# Patient Record
Sex: FEMALE | Race: WHITE | NOT HISPANIC OR LATINO | Employment: OTHER | URBAN - METROPOLITAN AREA
[De-identification: names, ages, dates, MRNs, and addresses within clinical notes are randomized per-mention and may not be internally consistent; named-entity substitution may affect disease eponyms.]

---

## 2020-02-04 ENCOUNTER — PROCEDURE VISIT (OUTPATIENT)
Dept: OBSTETRICS AND GYNECOLOGY | Facility: CLINIC | Age: 45
End: 2020-02-04
Payer: MEDICAID

## 2020-02-04 DIAGNOSIS — Z34.01 ENCOUNTER FOR SUPERVISION OF NORMAL FIRST PREGNANCY IN FIRST TRIMESTER: Primary | ICD-10-CM

## 2020-02-04 DIAGNOSIS — Z34.81 ENCOUNTER FOR SUPERVISION OF NORMAL PREGNANCY IN MULTIGRAVIDA IN FIRST TRIMESTER: Primary | ICD-10-CM

## 2020-02-04 PROCEDURE — 76817 PR US, OB, TRANSVAG APPROACH: ICD-10-PCS | Mod: S$GLB,,, | Performed by: OBSTETRICS & GYNECOLOGY

## 2020-02-04 PROCEDURE — 76817 TRANSVAGINAL US OBSTETRIC: CPT | Mod: S$GLB,,, | Performed by: OBSTETRICS & GYNECOLOGY

## 2020-02-05 LAB
ABS NRBC COUNT: 0 X 10 3/UL (ref 0–0.01)
ABSOLUTE BASOPHIL: 0.03 X 10 3/UL (ref 0–0.22)
ABSOLUTE EOSINOPHIL: 0.31 X 10 3/UL (ref 0.04–0.54)
ABSOLUTE IMMATURE GRAN: 0.05 X 10 3/UL (ref 0–0.04)
ABSOLUTE LYMPHOCYTE: 2.11 X 10 3/UL (ref 0.86–4.75)
ABSOLUTE MONOCYTE: 0.44 X 10 3/UL (ref 0.22–1.08)
AMPHETAMINES (500): NEGATIVE NG/ML
ANTIBODY SCREEN: NEGATIVE
BARBITURATES (200): NEGATIVE NG/ML
BASOPHILS NFR BLD: 0.3 % (ref 0.2–1.2)
BENZODIAZEPINES: NEGATIVE NG/ML
BLOOD GROUPING: NORMAL
BLOOD TYPE (D): NEGATIVE
COCAINE (150): NEGATIVE NG/ML
EOSINOPHIL NFR BLD: 3.4 % (ref 0.7–7)
HBV SURFACE AG SERPL QL IA: NONREACTIVE
HCT VFR BLD AUTO: 36.6 % (ref 37–47)
HGB BLD-MCNC: 11.7 G/DL (ref 12–16)
HIV 1+2 AB+HIV1 P24 AG SERPL QL IA: NONREACTIVE
IMMATURE GRANULOCYTES: 0.6 % (ref 0–0.5)
LYMPHOCYTES NFR BLD: 23.3 % (ref 19.3–53.1)
MARIJUANA, THC (50): NEGATIVE NG/ML
MCH RBC QN AUTO: 31.5 PG (ref 27–32)
MCHC RBC AUTO-ENTMCNC: 32 G/DL (ref 32–36)
MCV RBC AUTO: 98.7 FL (ref 82–100)
METHADONE: NEGATIVE NG/ML
MONOCYTES NFR BLD: 4.9 % (ref 4.7–12.5)
NEUTROPHILS ABSOLUTE COUNT: 6.11 X 10 3/UL (ref 2.15–7.56)
NEUTROPHILS NFR BLD: 67.5 %
NUCLEATED RED BLOOD CELLS: 0 /100 WBC (ref 0–0.2)
OPIATES: ABNORMAL NG/ML
OXYCODONE: NEGATIVE NG/ML
PH: 6.2 (ref 4.5–8)
PHENCYCLIDINE (25): NEGATIVE NG/ML
PLATELET # BLD AUTO: 483 X 10 3/UL (ref 135–400)
RBC # BLD AUTO: 3.71 X 10 6/UL (ref 4.2–5.4)
RDW-SD: 44 FL (ref 37–54)
RUBELLA IGG SCREEN: NORMAL
SICKLE CELL PREP: NEGATIVE
SYPHILIS TREPONEMAL ANTIBODY: NONREACTIVE
URINE CREATININE D/S: 28.3 MG/DL
WBC # BLD: 9.05 X 10 3/UL (ref 4.3–10.8)

## 2020-02-11 ENCOUNTER — ROUTINE PRENATAL (OUTPATIENT)
Dept: OBSTETRICS AND GYNECOLOGY | Facility: CLINIC | Age: 45
End: 2020-02-11
Payer: MEDICAID

## 2020-02-11 VITALS — DIASTOLIC BLOOD PRESSURE: 70 MMHG | SYSTOLIC BLOOD PRESSURE: 111 MMHG | HEART RATE: 80 BPM | WEIGHT: 127 LBS

## 2020-02-11 DIAGNOSIS — Z34.81 ENCOUNTER FOR SUPERVISION OF NORMAL PREGNANCY IN MULTIGRAVIDA IN FIRST TRIMESTER: Primary | ICD-10-CM

## 2020-02-11 DIAGNOSIS — O09.521 MULTIGRAVIDA OF ADVANCED MATERNAL AGE IN FIRST TRIMESTER: ICD-10-CM

## 2020-02-11 DIAGNOSIS — Z90.49 HISTORY OF BOWEL RESECTION: ICD-10-CM

## 2020-02-11 PROCEDURE — 99204 OFFICE O/P NEW MOD 45 MIN: CPT | Mod: TH,S$GLB,, | Performed by: OBSTETRICS & GYNECOLOGY

## 2020-02-11 PROCEDURE — 99204 PR OFFICE/OUTPT VISIT, NEW, LEVL IV, 45-59 MIN: ICD-10-PCS | Mod: TH,S$GLB,, | Performed by: OBSTETRICS & GYNECOLOGY

## 2020-02-11 NOTE — PROGRESS NOTES
Subjective:       Patient ID: Mary Alice Lanier is a 44 y.o.  at 11w1d   Chief Complaint:  Initial Prenatal Visit      History of Present Illness  here for new ob exam.  Labs and history were reviewed with the patient today  No complaints other than some abdominal pain associated with her recent surgery in January.    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  She had emergency surgery in January.  She said she had several x-rays and a CT of her lower abdomen.  She is concerned about the radiation to the fetus.  The patient is 44 years old and will need to see maternal Fetal Medicine.    Her history is significant for 4 vaginal deliveries.  They all seem to be in delivered around 36-37 weeks.  I did not feel that she needed to be on progesterone.      OB History  OB History    Para Term  AB Living   7 4 3 1 2 4   SAB TAB Ectopic Multiple Live Births           4      # Outcome Date GA Lbr Tristin/2nd Weight Sex Delivery Anes PTL Lv   7 Current            6 Term  38w5d    Vag-Spont   CROW   5 Term  38w0d    Vag-Spont   CROW   4 Term  37w5d    Vag-Spont   CROW   3   36w5d    Vag-Spont   CROW   2 AB            1 AB                Review of Systems  nml 1st trimester sx- sob, dec excercixe tolerance, fatigue and nausea  Neg for vag bleed, dc, vomiting, cp, lof, fever, chills, ns, visual changes, swelling, headaches, constipation/diarrhea, dysuria, freq/urgency of urination     Objective:     Vitals:    20 0803   BP: 111/70   Pulse: 80       NAD  NCAT  pupils normal size  Skin nml no rashes or lesions  No resp distress, resp even and unlabored  nt nd, no rebound no guarding  nml ext fem gent, normal cvx uterus and adnexa, no dc or bleeding  nml appearing rectum  No cyanosis or clubbing, edema appropriate for pregn    Uterus size approp for gest age         Assessment:        1. Encounter for supervision of normal pregnancy in multigravida in first trimester    2.  Multigravida of advanced maternal age in first trimester               Plan:      Encounter for supervision of normal pregnancy in multigravida in first trimester    Multigravida of advanced maternal age in first trimester         Pap gc cz  Start asa  Declines cfDNA  Pain fever bleeding precautions  Encouraged PNV  rtc 4 wks

## 2020-02-12 LAB
CHLAMYDIA: NEGATIVE
GONORRHEA: NEGATIVE
SOURCE: NORMAL
SOURCE: NORMAL
TRICHOMONAS AMPLIFIED: NEGATIVE

## 2020-02-13 LAB — URINE CULTURE, ROUTINE: NORMAL

## 2020-03-10 ENCOUNTER — ROUTINE PRENATAL (OUTPATIENT)
Dept: OBSTETRICS AND GYNECOLOGY | Facility: CLINIC | Age: 45
End: 2020-03-10
Payer: MEDICAID

## 2020-03-10 VITALS — HEART RATE: 65 BPM | SYSTOLIC BLOOD PRESSURE: 109 MMHG | DIASTOLIC BLOOD PRESSURE: 75 MMHG | WEIGHT: 138 LBS

## 2020-03-10 DIAGNOSIS — Z34.82 ENCOUNTER FOR SUPERVISION OF NORMAL PREGNANCY IN MULTIGRAVIDA IN SECOND TRIMESTER: Primary | ICD-10-CM

## 2020-03-10 DIAGNOSIS — Z90.49 HISTORY OF BOWEL RESECTION: ICD-10-CM

## 2020-03-10 DIAGNOSIS — O09.522 MULTIGRAVIDA OF ADVANCED MATERNAL AGE IN SECOND TRIMESTER: ICD-10-CM

## 2020-03-10 PROCEDURE — 99213 PR OFFICE/OUTPT VISIT, EST, LEVL III, 20-29 MIN: ICD-10-PCS | Mod: TH,S$GLB,, | Performed by: OBSTETRICS & GYNECOLOGY

## 2020-03-10 PROCEDURE — 99213 OFFICE O/P EST LOW 20 MIN: CPT | Mod: TH,S$GLB,, | Performed by: OBSTETRICS & GYNECOLOGY

## 2020-03-10 NOTE — PROGRESS NOTES
Subjective:       Patient ID: Mary Alice Lanier is a 44 y.o.  at 15w1d      Chief Complaint:  Routine Prenatal Visit      History of Present Illness  No complaints. Reports normal sx. Labs and history reviewed with pt.       Review of Systems  Denies n/v, f/c, dysuria, contractions,   VD, VB, round ligament pain, headaches       Objective:     Vitals:    03/10/20 0815   BP: 109/75   Pulse: 65     Wt Readings from Last 3 Encounters:   03/10/20 62.6 kg (138 lb)   20 57.6 kg (127 lb)        nad  NCAT  pupils normal size  Skin nml no rashes or lesions  No resp distress, resp even and unlabored  Gravid nt, no rebound no guarding  No cyanosis or clubbing, edema appropriate for pregn    FHT: 140's       Assessment:        1. Encounter for supervision of normal pregnancy in multigravida in second trimester    2. Multigravida of advanced maternal age in second trimester    3. History of bowel resection                Plan:        Prenatal testing - declined  Encouraged PNV  Pain, fever, bleeding precautions   RTC 4 weeks   mfm in 3 wks

## 2020-04-06 ENCOUNTER — OFFICE VISIT (OUTPATIENT)
Dept: OBSTETRICS AND GYNECOLOGY | Facility: CLINIC | Age: 45
End: 2020-04-06
Payer: MEDICAID

## 2020-04-06 VITALS — WEIGHT: 137 LBS

## 2020-04-06 DIAGNOSIS — Z34.82 ENCOUNTER FOR SUPERVISION OF NORMAL PREGNANCY IN MULTIGRAVIDA IN SECOND TRIMESTER: Primary | ICD-10-CM

## 2020-04-06 DIAGNOSIS — O09.522 MULTIGRAVIDA OF ADVANCED MATERNAL AGE IN SECOND TRIMESTER: ICD-10-CM

## 2020-04-06 DIAGNOSIS — Z90.49 HISTORY OF BOWEL RESECTION: ICD-10-CM

## 2020-04-06 PROCEDURE — 99213 PR OFFICE/OUTPT VISIT, EST, LEVL III, 20-29 MIN: ICD-10-PCS | Mod: TH,95,, | Performed by: OBSTETRICS & GYNECOLOGY

## 2020-04-06 PROCEDURE — 99213 OFFICE O/P EST LOW 20 MIN: CPT | Mod: TH,95,, | Performed by: OBSTETRICS & GYNECOLOGY

## 2020-04-06 NOTE — PROGRESS NOTES
Subjective:       Patient ID: Mary Alice Lanier is a 44 y.o.  at 19w0d      Chief Complaint:  No chief complaint on file.      History of Present Illness  No complaints. Reports normalsxLabs and history reviewed with pt.       Review of Systems  Denies n/v, f/c, dysuria, contractions,   VD, VB, round ligament pain, headaches       Objective:   There were no vitals filed for this visit.  Wt Readings from Last 3 Encounters:   03/10/20 62.6 kg (138 lb)   20 57.6 kg (127 lb)        Virtual visit done  No acute distress  Nonlabored breathing  Pupils reactive to light  Patient weighed herself  Does not appear to have any cyanosis clot being or edema       Assessment:        1. Encounter for supervision of normal pregnancy in multigravida in second trimester    2. Multigravida of advanced maternal age in second trimester    3. History of bowel resection                Plan:        Prenatal testing - declined  Anatomy scan sched with mfm  Encouraged PNV  Pain, fever, bleeding precautions   RTC 4 weeks

## 2020-04-21 DIAGNOSIS — O09.522 MULTIGRAVIDA OF ADVANCED MATERNAL AGE IN SECOND TRIMESTER: Primary | ICD-10-CM

## 2020-04-27 ENCOUNTER — ROUTINE PRENATAL (OUTPATIENT)
Dept: OBSTETRICS AND GYNECOLOGY | Facility: CLINIC | Age: 45
End: 2020-04-27
Payer: MEDICAID

## 2020-04-27 ENCOUNTER — INITIAL CONSULT (OUTPATIENT)
Dept: MATERNAL FETAL MEDICINE | Facility: CLINIC | Age: 45
End: 2020-04-27
Payer: MEDICAID

## 2020-04-27 VITALS
SYSTOLIC BLOOD PRESSURE: 122 MMHG | BODY MASS INDEX: 26.05 KG/M2 | HEART RATE: 82 BPM | SYSTOLIC BLOOD PRESSURE: 122 MMHG | DIASTOLIC BLOOD PRESSURE: 80 MMHG | HEIGHT: 63 IN | HEART RATE: 82 BPM | WEIGHT: 147 LBS | BODY MASS INDEX: 26.04 KG/M2 | DIASTOLIC BLOOD PRESSURE: 80 MMHG | OXYGEN SATURATION: 98 % | RESPIRATION RATE: 20 BRPM | WEIGHT: 147 LBS

## 2020-04-27 DIAGNOSIS — O09.522 MULTIGRAVIDA OF ADVANCED MATERNAL AGE IN SECOND TRIMESTER: ICD-10-CM

## 2020-04-27 DIAGNOSIS — Z90.49 HISTORY OF BOWEL RESECTION: ICD-10-CM

## 2020-04-27 DIAGNOSIS — O09.529 ELDERLY MULTIGRAVIDA WITH ANTEPARTUM CONDITION OR COMPLICATION: Primary | ICD-10-CM

## 2020-04-27 DIAGNOSIS — Z34.82 ENCOUNTER FOR SUPERVISION OF NORMAL PREGNANCY IN MULTIGRAVIDA IN SECOND TRIMESTER: Primary | ICD-10-CM

## 2020-04-27 PROCEDURE — 76811 PR US, OB FETAL EVAL & EXAM, TRANSABDOM,FIRST GESTATION: ICD-10-PCS | Mod: GT,S$GLB,, | Performed by: OBSTETRICS & GYNECOLOGY

## 2020-04-27 PROCEDURE — 99213 PR OFFICE/OUTPT VISIT, EST, LEVL III, 20-29 MIN: ICD-10-PCS | Mod: TH,S$GLB,, | Performed by: OBSTETRICS & GYNECOLOGY

## 2020-04-27 PROCEDURE — 76811 OB US DETAILED SNGL FETUS: CPT | Mod: GT,S$GLB,, | Performed by: OBSTETRICS & GYNECOLOGY

## 2020-04-27 PROCEDURE — 99213 OFFICE O/P EST LOW 20 MIN: CPT | Mod: TH,S$GLB,, | Performed by: OBSTETRICS & GYNECOLOGY

## 2020-04-27 PROCEDURE — 99203 OFFICE O/P NEW LOW 30 MIN: CPT | Mod: 25,TH,S$GLB, | Performed by: OBSTETRICS & GYNECOLOGY

## 2020-04-27 PROCEDURE — 99203 PR OFFICE/OUTPT VISIT, NEW, LEVL III, 30-44 MIN: ICD-10-PCS | Mod: 25,TH,S$GLB, | Performed by: OBSTETRICS & GYNECOLOGY

## 2020-04-27 RX ORDER — NAPROXEN SODIUM 220 MG/1
81 TABLET, FILM COATED ORAL DAILY
COMMUNITY
End: 2021-05-13

## 2020-04-27 NOTE — PROGRESS NOTES
"Mary Alice is here for initial MFM consultation, referred by Dr. Hui for AMA, declined testing, and also radiation exposure with colon resection surgery in January 2020 (cecal volvulus)    She is feeling fetal movement.    Mary Alice denies vaginal bleeding, loss of fluid, recurrent cramping.      Vitals:    04/27/20 0839   BP: 122/80   Pulse: 82   Resp: 20   SpO2: 98%   Weight: 66.7 kg (147 lb)   Height: 5' 3" (1.6 m)      BMI:                    26.04 kg/m^2             "

## 2020-04-27 NOTE — PROGRESS NOTES
Subjective:       Patient ID: Mary Alice Lanier is a 44 y.o.  at 22w0d      Chief Complaint:  Routine Prenatal Visit    MFM visit today as well  History of Present Illness  No complaints. Reports normal sx. Labs and history reviewed with pt.       Review of Systems  Denies n/v, f/c, dysuria, contractions,   VD, VB, round ligament pain, headaches       Objective:     Vitals:    20 0846   BP: 122/80   Pulse: 82     Wt Readings from Last 3 Encounters:   20 66.7 kg (147 lb)   20 66.7 kg (147 lb)   20 62.1 kg (137 lb)        nad  NCAT  pupils normal size  Skin nml no rashes or lesions  No resp distress, resp even and unlabored  Gravid nt, no rebound no guarding  No cyanosis or clubbing, edema appropriate for pregn    FHT: 140's       Assessment:        1. Encounter for supervision of normal pregnancy in multigravida in second trimester    2. Multigravida of advanced maternal age in second trimester    3. History of bowel resection                Plan:        Encouraged PNV  Pain, fever, bleeding precautions   RTC 4 weeks

## 2020-04-27 NOTE — PROGRESS NOTES
Indication for consultation:  Advanced maternal age  Gastric surgery in pregnancy    Provider requesting consultation:  Dr. Gilbert Hui    Dear Dr. Hui    I had the pleasure of seeing Mary Alice More for initial consultation today via telemedicine consultation.  As you recall she is a 44 y.o.  at 22w0d here for consultation regarding advanced maternal age and history of gastric surgery and pregnancy.    PMH:  The patient has a known history of depression.  She is currently not taking any medication for this disorder.    Ob Hx:  This is the patient's 7th pregnancy.  Her 1st pregnancy was delivered in .  She had a spontaneous delivery of a 6 lb 8 oz male  at 36 weeks of gestation.  Since that time she has had 3 successful term vaginal deliveries.  Each 1 of these pregnancies does have some notable  contractions.  She also appears to have 2 first-trimester miscarriages.      PSH:  The patient has umbilical hernia repair history.  She has also had a prior history wisdom tooth extraction.  She also previously had diagnostic laparoscopy.  Her most involved surgery occurred in January of this year.  She ended up having a volvulus that required bowel resection on 2020.     SOC:  She denies any tobacco, alcohol or recreational drug use.    Medications:  Aspirin 81 mg once per day.    Family hx:  She denies any family history of congenital anomalies.  She denies any family history of genetic abnormalities.  She previously has declined aneuploid screening this pregnancy.    Allergies:  She is allergic to butalbital    Review of systems: The patient denies any vaginal bleeding, loss of fluid or contraction pain today.  Vitals:    20 0839   BP: 122/80   Pulse: 82   Resp: 20       Physical exam:  Gen: WDWN in NAD  HEENT: WNL  Abdomen: Soft, non-tender  Skin: No rash or jaundice  Extremities: No clubbing or cyanosis  Neuro: Grossly intact    Ultrasound:  A detailed fetal anatomical survey  was completed today.  There is a single intrauterine pregnancy noted.  Structurally there does not appear to be any fetal abnormalities however the fetal spine is suboptimal secondary to positioning.  Fortunately the intracranial contents appear normal and thus very unlikely that there is a spinal abnormality.  The growth is reassuring.  The amniotic fluid volume is normal.  Placenta does not show any evidence of previa.  Please see our official report for full specifics.    Recommendations:  Today I discussed with the patient the increased risk of fetal aneuploidy with advancing maternal age.  I reviewed with the patient the limitations of ultrasound in the diagnosis of fetal aneuploidy.  I discussed with the patient further screening options, including free fetal DNA screening.  I reviewed with the patient the sensitivity and specificity of free fetal DNA screening.  Lastly I discussed with the patient the definitive diagnostic option of amniocentesis, including its fetal loss rate.  At the end of our conversation today she declined amniocentesis.    I went on further to discuss the patient's risk associated with her advanced maternal age.  There is an uptake in maternal morbidity and fetal mortality as women age past 40 years.  I would suggest in your office to reassess fetal growth every 4-6 weeks beginning at 28 weeks.  I would suggest some form of fetal well-being testing to begin at 34-36 weeks and continuing till delivery.  I would recommend delivery at 39-40 weeks EGA if she is not delivered by that time.    Thanks once again for allowing us to participate in the care of your patients.  If you have any questions about today's consultation feel free to contact me or my partners at [phone].    Sincerely,

## 2020-05-18 ENCOUNTER — ROUTINE PRENATAL (OUTPATIENT)
Dept: OBSTETRICS AND GYNECOLOGY | Facility: CLINIC | Age: 45
End: 2020-05-18
Payer: MEDICAID

## 2020-05-18 VITALS
SYSTOLIC BLOOD PRESSURE: 108 MMHG | DIASTOLIC BLOOD PRESSURE: 74 MMHG | WEIGHT: 152 LBS | BODY MASS INDEX: 26.93 KG/M2 | HEART RATE: 73 BPM

## 2020-05-18 DIAGNOSIS — Z90.49 HISTORY OF BOWEL RESECTION: ICD-10-CM

## 2020-05-18 DIAGNOSIS — Z34.82 ENCOUNTER FOR SUPERVISION OF NORMAL PREGNANCY IN MULTIGRAVIDA IN SECOND TRIMESTER: Primary | ICD-10-CM

## 2020-05-18 DIAGNOSIS — O09.522 MULTIGRAVIDA OF ADVANCED MATERNAL AGE IN SECOND TRIMESTER: ICD-10-CM

## 2020-05-18 LAB — GLUCOSE 1 HR POST 50 GM: 134 MG/DL

## 2020-05-18 PROCEDURE — 99213 OFFICE O/P EST LOW 20 MIN: CPT | Mod: TH,S$GLB,, | Performed by: OBSTETRICS & GYNECOLOGY

## 2020-05-18 PROCEDURE — 99213 PR OFFICE/OUTPT VISIT, EST, LEVL III, 20-29 MIN: ICD-10-PCS | Mod: TH,S$GLB,, | Performed by: OBSTETRICS & GYNECOLOGY

## 2020-05-18 NOTE — PROGRESS NOTES
Subjective:       Patient ID: Mary Alice Lanier is a 44 y.o.  at 25w0d      Chief Complaint:  Routine Prenatal Visit      History of Present Illness  No complaints. Reports normal sx. Labs and history reviewed with pt.       Review of Systems  Denies n/v, f/c, dysuria, contractions,   VD, VB, round ligament pain, headaches       Objective:     Vitals:    20 0942   BP: 108/74   Pulse: 73     Wt Readings from Last 3 Encounters:   20 68.9 kg (152 lb)   20 66.7 kg (147 lb)   20 66.7 kg (147 lb)        nad  NCAT  pupils normal size  Skin nml no rashes or lesions  No resp distress, resp even and unlabored  Gravid nt, no rebound no guarding  No cyanosis or clubbing, edema appropriate for pregn    FHT:150's       Assessment:        1. Encounter for supervision of normal pregnancy in multigravida in second trimester    2. Multigravida of advanced maternal age in second trimester    3. History of bowel resection                Plan:      grwoth scan next visit  Prenatal testing - declined  Anatomy scan nml  O'Sul today  Encouraged PNV  Pain, fever, bleeding precautions   RTC 3 weeks

## 2020-06-05 DIAGNOSIS — Z34.82 ENCOUNTER FOR SUPERVISION OF NORMAL PREGNANCY IN MULTIGRAVIDA IN SECOND TRIMESTER: Primary | ICD-10-CM

## 2020-06-08 ENCOUNTER — PROCEDURE VISIT (OUTPATIENT)
Dept: OBSTETRICS AND GYNECOLOGY | Facility: CLINIC | Age: 45
End: 2020-06-08
Payer: MEDICAID

## 2020-06-08 ENCOUNTER — ROUTINE PRENATAL (OUTPATIENT)
Dept: OBSTETRICS AND GYNECOLOGY | Facility: CLINIC | Age: 45
End: 2020-06-08
Payer: MEDICAID

## 2020-06-08 VITALS
HEART RATE: 85 BPM | DIASTOLIC BLOOD PRESSURE: 69 MMHG | SYSTOLIC BLOOD PRESSURE: 114 MMHG | WEIGHT: 160 LBS | BODY MASS INDEX: 28.34 KG/M2

## 2020-06-08 DIAGNOSIS — Z34.82 ENCOUNTER FOR SUPERVISION OF NORMAL PREGNANCY IN MULTIGRAVIDA IN SECOND TRIMESTER: ICD-10-CM

## 2020-06-08 DIAGNOSIS — Z34.82 ENCOUNTER FOR SUPERVISION OF NORMAL PREGNANCY IN MULTIGRAVIDA IN SECOND TRIMESTER: Primary | ICD-10-CM

## 2020-06-08 DIAGNOSIS — Z90.49 HISTORY OF BOWEL RESECTION: ICD-10-CM

## 2020-06-08 DIAGNOSIS — O09.522 MULTIGRAVIDA OF ADVANCED MATERNAL AGE IN SECOND TRIMESTER: ICD-10-CM

## 2020-06-08 PROCEDURE — 99213 PR OFFICE/OUTPT VISIT, EST, LEVL III, 20-29 MIN: ICD-10-PCS | Mod: 25,TH,S$GLB, | Performed by: OBSTETRICS & GYNECOLOGY

## 2020-06-08 PROCEDURE — 76815 OB US LIMITED FETUS(S): CPT | Mod: S$GLB,,, | Performed by: OBSTETRICS & GYNECOLOGY

## 2020-06-08 PROCEDURE — 76815 PR  US,PREGNANT UTERUS,LIMITED, 1/> FETUSES: ICD-10-PCS | Mod: S$GLB,,, | Performed by: OBSTETRICS & GYNECOLOGY

## 2020-06-08 PROCEDURE — 99213 OFFICE O/P EST LOW 20 MIN: CPT | Mod: 25,TH,S$GLB, | Performed by: OBSTETRICS & GYNECOLOGY

## 2020-06-08 NOTE — PROGRESS NOTES
Subjective:       Patient ID: Mary Alice Lanier is a 44 y.o.  at 28w0d     Chief Complaint:  Routine Prenatal Visit      History of Present Illness  No complaints. Reports normal sx. Labs and history reviewed with pt.         Review of Systems  Denies n/v, f/c, dysuria, contractions,   VD, VB, round ligament pain, headaches, preE ROS       Objective:     Vitals:    20 0904   BP: 114/69   Pulse: 85     Wt Readings from Last 3 Encounters:   20 72.6 kg (160 lb)   20 68.9 kg (152 lb)   20 66.7 kg (147 lb)       nad  NCAT  pupils normal size  Skin nml no rashes or lesions  No resp distress, resp even and unlabored  Gravid nt, no rebound no guarding  No cyanosis or clubbing, edema appropriate for pregn    Growth scan nml today  See     fht nml    Assessment:        1. Encounter for supervision of normal pregnancy in multigravida in second trimester    2. Multigravida of advanced maternal age in second trimester    3. History of bowel resection                Plan:        Encouraged PNV  Pain, fever, bleeding precautions   RTC 3weeks   gorwth in 6 wks

## 2020-06-25 ENCOUNTER — ROUTINE PRENATAL (OUTPATIENT)
Dept: OBSTETRICS AND GYNECOLOGY | Facility: CLINIC | Age: 45
End: 2020-06-25
Payer: MEDICAID

## 2020-06-25 VITALS
HEART RATE: 81 BPM | BODY MASS INDEX: 28.34 KG/M2 | SYSTOLIC BLOOD PRESSURE: 111 MMHG | DIASTOLIC BLOOD PRESSURE: 72 MMHG | WEIGHT: 160 LBS

## 2020-06-25 DIAGNOSIS — Z34.82 ENCOUNTER FOR SUPERVISION OF NORMAL PREGNANCY IN MULTIGRAVIDA IN SECOND TRIMESTER: Primary | ICD-10-CM

## 2020-06-25 PROCEDURE — 99213 PR OFFICE/OUTPT VISIT, EST, LEVL III, 20-29 MIN: ICD-10-PCS | Mod: 25,TH,S$GLB, | Performed by: OBSTETRICS & GYNECOLOGY

## 2020-06-25 PROCEDURE — 90715 TDAP VACCINE GREATER THAN OR EQUAL TO 7YO IM: ICD-10-PCS | Mod: S$GLB,,, | Performed by: OBSTETRICS & GYNECOLOGY

## 2020-06-25 PROCEDURE — 90471 TDAP VACCINE GREATER THAN OR EQUAL TO 7YO IM: ICD-10-PCS | Mod: S$GLB,,, | Performed by: OBSTETRICS & GYNECOLOGY

## 2020-06-25 PROCEDURE — 99213 OFFICE O/P EST LOW 20 MIN: CPT | Mod: 25,TH,S$GLB, | Performed by: OBSTETRICS & GYNECOLOGY

## 2020-06-25 PROCEDURE — 90715 TDAP VACCINE 7 YRS/> IM: CPT | Mod: S$GLB,,, | Performed by: OBSTETRICS & GYNECOLOGY

## 2020-06-25 PROCEDURE — 90471 IMMUNIZATION ADMIN: CPT | Mod: S$GLB,,, | Performed by: OBSTETRICS & GYNECOLOGY

## 2020-06-25 NOTE — PROGRESS NOTES
Subjective:       Patient ID: Mary Alice Lanier is a 44 y.o.  at 30w3d     Chief Complaint:  Routine Prenatal Visit      History of Present Illness  No complaints. Reports normal sx. Labs and history reviewed with pt.         Review of Systems  Denies n/v, f/c, dysuria, contractions,   VD, VB, round ligament pain, headaches, preE ROS       Objective:     Vitals:    20 0828   BP: 111/72   Pulse: 81     Wt Readings from Last 3 Encounters:   20 72.6 kg (160 lb)   20 72.6 kg (160 lb)   20 68.9 kg (152 lb)       nad  NCAT  pupils normal size  Skin nml no rashes or lesions  No resp distress, resp even and unlabored  Gravid nt, no rebound no guarding  No cyanosis or clubbing, edema appropriate for pregn    FHT:140's      Assessment:        1. Encounter for supervision of normal pregnancy in multigravida in second trimester                Plan:        Encouraged PNV  Pain, fever, bleeding precautions   RTC 2 weeks

## 2020-07-15 ENCOUNTER — TELEPHONE (OUTPATIENT)
Dept: OBSTETRICS AND GYNECOLOGY | Facility: CLINIC | Age: 45
End: 2020-07-15

## 2020-07-15 NOTE — TELEPHONE ENCOUNTER
Patient stated that she woke up this morning with a lot of pelvic pressure, and has been having sharp pelvic pains throughout the day. She also stated that she had 6 mild contractions between 12:00pm-12:30pm. Spoke with Dr. Hui and Rola Louis MA about patient. Advised patient to be sure that she is hydrated and if no relief with rest and hydration then go to labor and delivery for evaluation. Patient verbalized understanding and stated that she was feeling better after drinking a bottle of water and taking a shower.     Rola Bennett RN

## 2020-07-17 DIAGNOSIS — O09.522 MULTIGRAVIDA OF ADVANCED MATERNAL AGE IN SECOND TRIMESTER: Primary | ICD-10-CM

## 2020-07-20 ENCOUNTER — PROCEDURE VISIT (OUTPATIENT)
Dept: OBSTETRICS AND GYNECOLOGY | Facility: CLINIC | Age: 45
End: 2020-07-20
Payer: MEDICAID

## 2020-07-20 ENCOUNTER — ROUTINE PRENATAL (OUTPATIENT)
Dept: OBSTETRICS AND GYNECOLOGY | Facility: CLINIC | Age: 45
End: 2020-07-20
Payer: MEDICAID

## 2020-07-20 VITALS
HEART RATE: 93 BPM | WEIGHT: 164 LBS | SYSTOLIC BLOOD PRESSURE: 133 MMHG | DIASTOLIC BLOOD PRESSURE: 75 MMHG | BODY MASS INDEX: 29.05 KG/M2

## 2020-07-20 DIAGNOSIS — Z34.83 ENCOUNTER FOR SUPERVISION OF NORMAL PREGNANCY IN MULTIGRAVIDA IN THIRD TRIMESTER: Primary | ICD-10-CM

## 2020-07-20 DIAGNOSIS — Z90.49 HISTORY OF BOWEL RESECTION: ICD-10-CM

## 2020-07-20 DIAGNOSIS — O09.522 MULTIGRAVIDA OF ADVANCED MATERNAL AGE IN SECOND TRIMESTER: ICD-10-CM

## 2020-07-20 DIAGNOSIS — O09.523 MULTIGRAVIDA OF ADVANCED MATERNAL AGE IN THIRD TRIMESTER: ICD-10-CM

## 2020-07-20 PROCEDURE — 76815 PR  US,PREGNANT UTERUS,LIMITED, 1/> FETUSES: ICD-10-PCS | Mod: S$GLB,,, | Performed by: OBSTETRICS & GYNECOLOGY

## 2020-07-20 PROCEDURE — 76815 OB US LIMITED FETUS(S): CPT | Mod: S$GLB,,, | Performed by: OBSTETRICS & GYNECOLOGY

## 2020-07-20 PROCEDURE — 99213 OFFICE O/P EST LOW 20 MIN: CPT | Mod: 25,TH,S$GLB, | Performed by: OBSTETRICS & GYNECOLOGY

## 2020-07-20 PROCEDURE — 99213 PR OFFICE/OUTPT VISIT, EST, LEVL III, 20-29 MIN: ICD-10-PCS | Mod: 25,TH,S$GLB, | Performed by: OBSTETRICS & GYNECOLOGY

## 2020-07-20 NOTE — PROGRESS NOTES
Subjective:       Patient ID: Mary Alice Lanier is a 44 y.o.  at 34w0d     Chief Complaint:  Routine Prenatal Visit      History of Present Illness  No complaints. Reports ctx and pelvic pressure. Labs and history reviewed with pt.         Review of Systems  Denies n/v, f/c, dysuria, contractions,   VD, VB, round ligament pain, headaches, preE ROS       Objective:     Vitals:    20 0851   BP: 133/75   Pulse: 93     Wt Readings from Last 3 Encounters:   20 74.4 kg (164 lb)   20 72.6 kg (160 lb)   20 72.6 kg (160 lb)       nad  NCAT  pupils normal size  Skin nml no rashes or lesions  No resp distress, resp even and unlabored  Gravid nt, no rebound no guarding  No cyanosis or clubbing, edema appropriate for pregn    CVX:1 th h    Assessment:        1. Encounter for supervision of normal pregnancy in multigravida in third trimester    2. History of bowel resection    3. Multigravida of advanced maternal age in third trimester                Plan:            Encouraged PNV  Pain, fever, bleeding precautions   RTC 1 weeks

## 2020-07-28 ENCOUNTER — ROUTINE PRENATAL (OUTPATIENT)
Dept: OBSTETRICS AND GYNECOLOGY | Facility: CLINIC | Age: 45
End: 2020-07-28
Payer: MEDICAID

## 2020-07-28 VITALS
HEART RATE: 81 BPM | SYSTOLIC BLOOD PRESSURE: 122 MMHG | DIASTOLIC BLOOD PRESSURE: 76 MMHG | BODY MASS INDEX: 29.05 KG/M2 | WEIGHT: 164 LBS

## 2020-07-28 DIAGNOSIS — Z34.83 ENCOUNTER FOR SUPERVISION OF NORMAL PREGNANCY IN MULTIGRAVIDA IN THIRD TRIMESTER: Primary | ICD-10-CM

## 2020-07-28 DIAGNOSIS — O09.523 MULTIGRAVIDA OF ADVANCED MATERNAL AGE IN THIRD TRIMESTER: ICD-10-CM

## 2020-07-28 DIAGNOSIS — Z90.49 HISTORY OF BOWEL RESECTION: ICD-10-CM

## 2020-07-28 PROCEDURE — 99213 PR OFFICE/OUTPT VISIT, EST, LEVL III, 20-29 MIN: ICD-10-PCS | Mod: TH,S$GLB,, | Performed by: OBSTETRICS & GYNECOLOGY

## 2020-07-28 PROCEDURE — 99213 OFFICE O/P EST LOW 20 MIN: CPT | Mod: TH,S$GLB,, | Performed by: OBSTETRICS & GYNECOLOGY

## 2020-07-28 NOTE — PROGRESS NOTES
Subjective:       Patient ID: Mary Alice Lanier is a 44 y.o.  at 35w1d     Chief Complaint:  No chief complaint on file.      History of Present Illness  No complaints. Reports normal sx. Labs and history reviewed with pt.         Review of Systems  Denies n/v, f/c, dysuria, contractions,   VD, VB, round ligament pain, headaches, preE ROS       Objective:     Vitals:    20 0800   BP: 122/76   Pulse: 81     Wt Readings from Last 3 Encounters:   20 74.4 kg (164 lb)   20 74.4 kg (164 lb)   20 72.6 kg (160 lb)       nad  NCAT  pupils normal size  Skin nml no rashes or lesions  No resp distress, resp even and unlabored  Gravid nt, no rebound no guarding  No cyanosis or clubbing, edema appropriate for pregn    [id number]  CVX:  h    Assessment:        1. Encounter for supervision of normal pregnancy in multigravida in third trimester    2. History of bowel resection    3. Multigravida of advanced maternal age in third trimester                Plan:        GBS    Encouraged PNV  Pain, fever, bleeding precautions   RTC 1 weeks

## 2020-07-29 LAB
GROUP B STREP MOLECULAR: NEGATIVE
PENICILLIN ALLERGIC: NORMAL

## 2020-08-04 ENCOUNTER — ROUTINE PRENATAL (OUTPATIENT)
Dept: OBSTETRICS AND GYNECOLOGY | Facility: CLINIC | Age: 45
End: 2020-08-04
Payer: MEDICAID

## 2020-08-04 VITALS
BODY MASS INDEX: 28.7 KG/M2 | DIASTOLIC BLOOD PRESSURE: 72 MMHG | HEART RATE: 85 BPM | SYSTOLIC BLOOD PRESSURE: 120 MMHG | WEIGHT: 162 LBS

## 2020-08-04 DIAGNOSIS — Z34.83 ENCOUNTER FOR SUPERVISION OF NORMAL PREGNANCY IN MULTIGRAVIDA IN THIRD TRIMESTER: ICD-10-CM

## 2020-08-04 DIAGNOSIS — Z90.49 HISTORY OF BOWEL RESECTION: ICD-10-CM

## 2020-08-04 DIAGNOSIS — O09.523 MULTIGRAVIDA OF ADVANCED MATERNAL AGE IN THIRD TRIMESTER: Primary | ICD-10-CM

## 2020-08-04 PROCEDURE — 99213 OFFICE O/P EST LOW 20 MIN: CPT | Mod: TH,S$GLB,, | Performed by: OBSTETRICS & GYNECOLOGY

## 2020-08-04 PROCEDURE — 99213 PR OFFICE/OUTPT VISIT, EST, LEVL III, 20-29 MIN: ICD-10-PCS | Mod: TH,S$GLB,, | Performed by: OBSTETRICS & GYNECOLOGY

## 2020-08-04 NOTE — PROGRESS NOTES
Subjective:       Patient ID: Mary Alice Lanier is a 44 y.o.  at 36w1d     Chief Complaint:  Routine Prenatal Visit      History of Present Illness  No complaints. Reports normal sx. Labs and history reviewed with pt.         Review of Systems  Denies n/v, f/c, dysuria, contractions,   VD, VB, round ligament pain, headaches, preE ROS       Objective:     Vitals:    20 0810   BP: 120/72   Pulse: 85     Wt Readings from Last 3 Encounters:   20 73.5 kg (162 lb)   20 74.4 kg (164 lb)   20 74.4 kg (164 lb)       nad  NCAT  pupils normal size  Skin nml no rashes or lesions  No resp distress, resp even and unlabored  Gravid nt, no rebound no guarding  No cyanosis or clubbing, edema appropriate for pregn    FHT:140's  cvx - high    Assessment:        1. Multigravida of advanced maternal age in third trimester    2. History of bowel resection    3. Encounter for supervision of normal pregnancy in multigravida in third trimester                Plan:        Encouraged PNV  Pain, fever, bleeding precautions   RTC 1 weeks

## 2020-08-11 ENCOUNTER — ROUTINE PRENATAL (OUTPATIENT)
Dept: OBSTETRICS AND GYNECOLOGY | Facility: CLINIC | Age: 45
End: 2020-08-11
Payer: MEDICAID

## 2020-08-11 VITALS
HEART RATE: 83 BPM | BODY MASS INDEX: 28.87 KG/M2 | DIASTOLIC BLOOD PRESSURE: 84 MMHG | WEIGHT: 163 LBS | SYSTOLIC BLOOD PRESSURE: 136 MMHG

## 2020-08-11 DIAGNOSIS — O09.523 MULTIGRAVIDA OF ADVANCED MATERNAL AGE IN THIRD TRIMESTER: Primary | ICD-10-CM

## 2020-08-11 DIAGNOSIS — Z90.49 HISTORY OF BOWEL RESECTION: ICD-10-CM

## 2020-08-11 PROCEDURE — 99213 PR OFFICE/OUTPT VISIT, EST, LEVL III, 20-29 MIN: ICD-10-PCS | Mod: TH,S$GLB,, | Performed by: OBSTETRICS & GYNECOLOGY

## 2020-08-11 PROCEDURE — 99213 OFFICE O/P EST LOW 20 MIN: CPT | Mod: TH,S$GLB,, | Performed by: OBSTETRICS & GYNECOLOGY

## 2020-08-18 ENCOUNTER — OUTSIDE PLACE OF SERVICE (OUTPATIENT)
Dept: OBSTETRICS AND GYNECOLOGY | Facility: CLINIC | Age: 45
End: 2020-08-18

## 2020-08-18 ENCOUNTER — ROUTINE PRENATAL (OUTPATIENT)
Dept: OBSTETRICS AND GYNECOLOGY | Facility: CLINIC | Age: 45
End: 2020-08-18
Payer: MEDICAID

## 2020-08-18 VITALS
HEART RATE: 73 BPM | SYSTOLIC BLOOD PRESSURE: 131 MMHG | DIASTOLIC BLOOD PRESSURE: 77 MMHG | WEIGHT: 163 LBS | BODY MASS INDEX: 28.87 KG/M2

## 2020-08-18 DIAGNOSIS — O09.523 MULTIGRAVIDA OF ADVANCED MATERNAL AGE IN THIRD TRIMESTER: Primary | ICD-10-CM

## 2020-08-18 DIAGNOSIS — Z90.49 HISTORY OF BOWEL RESECTION: ICD-10-CM

## 2020-08-18 DIAGNOSIS — Z34.83 ENCOUNTER FOR SUPERVISION OF NORMAL PREGNANCY IN MULTIGRAVIDA IN THIRD TRIMESTER: ICD-10-CM

## 2020-08-18 PROCEDURE — 59409 PR OBSTETRICAL CARE,VAG DELIV ONLY: ICD-10-PCS | Mod: AT,,, | Performed by: OBSTETRICS & GYNECOLOGY

## 2020-08-18 PROCEDURE — 99213 OFFICE O/P EST LOW 20 MIN: CPT | Mod: TH,S$GLB,, | Performed by: OBSTETRICS & GYNECOLOGY

## 2020-08-18 PROCEDURE — 59409 OBSTETRICAL CARE: CPT | Mod: AT,,, | Performed by: OBSTETRICS & GYNECOLOGY

## 2020-08-18 PROCEDURE — 99213 PR OFFICE/OUTPT VISIT, EST, LEVL III, 20-29 MIN: ICD-10-PCS | Mod: TH,S$GLB,, | Performed by: OBSTETRICS & GYNECOLOGY

## 2020-08-18 NOTE — PROGRESS NOTES
Subjective:       Patient ID: Mary Alice Lanier is a 44 y.o.  at 38w1d     Chief Complaint:  Routine Prenatal Visit      History of Present Illness  No complaints. Reports normal sx. Labs and history reviewed with pt.         Review of Systems  Denies n/v, f/c, dysuria, contractions,   VD, VB, round ligament pain, headaches, preE ROS       Objective:     Vitals:    20 0803   BP: 131/77   Pulse: 73     Wt Readings from Last 3 Encounters:   20 73.9 kg (163 lb)   20 73.9 kg (163 lb)   20 73.5 kg (162 lb)       nad  NCAT  pupils normal size  Skin nml no rashes or lesions  No resp distress, resp even and unlabored  Gravid nt, no rebound no guarding  No cyanosis or clubbing, edema appropriate for pregn    FHT:140's  CVX: 3/75/h    Assessment:        1. Multigravida of advanced maternal age in third trimester    2. History of bowel resection    3. Encounter for supervision of normal pregnancy in multigravida in third trimester                Plan:        Encouraged PNV  Pain, fever, bleeding precautions   Pt in labor- to labor unnit

## 2020-08-19 PROCEDURE — 99238 PR HOSPITAL DISCHARGE DAY,<30 MIN: ICD-10-PCS | Mod: ,,, | Performed by: OBSTETRICS & GYNECOLOGY

## 2020-08-19 PROCEDURE — 99238 HOSP IP/OBS DSCHRG MGMT 30/<: CPT | Mod: ,,, | Performed by: OBSTETRICS & GYNECOLOGY

## 2021-04-21 ENCOUNTER — PATIENT MESSAGE (OUTPATIENT)
Dept: OBSTETRICS AND GYNECOLOGY | Facility: CLINIC | Age: 46
End: 2021-04-21

## 2021-04-21 DIAGNOSIS — Z30.9 ENCOUNTER FOR CONTRACEPTIVE MANAGEMENT, UNSPECIFIED TYPE: Primary | ICD-10-CM

## 2021-04-21 RX ORDER — NORGESTIMATE AND ETHINYL ESTRADIOL 0.25-0.035
1 KIT ORAL DAILY
Qty: 30 TABLET | Refills: 2 | Status: SHIPPED | OUTPATIENT
Start: 2021-04-21 | End: 2021-05-13

## 2021-05-05 ENCOUNTER — OFFICE VISIT (OUTPATIENT)
Dept: OBSTETRICS AND GYNECOLOGY | Facility: CLINIC | Age: 46
End: 2021-05-05
Payer: MEDICAID

## 2021-05-05 VITALS
BODY MASS INDEX: 26.39 KG/M2 | HEART RATE: 66 BPM | WEIGHT: 149 LBS | DIASTOLIC BLOOD PRESSURE: 77 MMHG | SYSTOLIC BLOOD PRESSURE: 124 MMHG

## 2021-05-05 DIAGNOSIS — Z30.9 ENCOUNTER FOR CONTRACEPTIVE MANAGEMENT, UNSPECIFIED TYPE: Primary | ICD-10-CM

## 2021-05-05 PROCEDURE — 99213 OFFICE O/P EST LOW 20 MIN: CPT | Mod: S$GLB,,, | Performed by: OBSTETRICS & GYNECOLOGY

## 2021-05-05 PROCEDURE — 99213 PR OFFICE/OUTPT VISIT, EST, LEVL III, 20-29 MIN: ICD-10-PCS | Mod: S$GLB,,, | Performed by: OBSTETRICS & GYNECOLOGY

## 2021-05-13 ENCOUNTER — OFFICE VISIT (OUTPATIENT)
Dept: OBSTETRICS AND GYNECOLOGY | Facility: CLINIC | Age: 46
End: 2021-05-13
Payer: MEDICAID

## 2021-05-13 VITALS
HEIGHT: 63 IN | DIASTOLIC BLOOD PRESSURE: 85 MMHG | WEIGHT: 145 LBS | HEART RATE: 89 BPM | SYSTOLIC BLOOD PRESSURE: 121 MMHG | BODY MASS INDEX: 25.69 KG/M2

## 2021-05-13 DIAGNOSIS — Z30.430 ENCOUNTER FOR INSERTION OF MIRENA IUD: Primary | ICD-10-CM

## 2021-05-13 PROCEDURE — 58300 INSERT INTRAUTERINE DEVICE: CPT | Mod: S$GLB,,, | Performed by: OBSTETRICS & GYNECOLOGY

## 2021-05-13 PROCEDURE — 58300 INSERTION OF IUD: ICD-10-PCS | Mod: S$GLB,,, | Performed by: OBSTETRICS & GYNECOLOGY

## 2021-05-13 PROCEDURE — 99499 NO LOS: ICD-10-PCS | Mod: S$GLB,,, | Performed by: OBSTETRICS & GYNECOLOGY

## 2021-05-13 PROCEDURE — 99499 UNLISTED E&M SERVICE: CPT | Mod: S$GLB,,, | Performed by: OBSTETRICS & GYNECOLOGY

## 2021-05-13 RX ORDER — LEVONORGESTREL 52 MG/1
INTRAUTERINE DEVICE INTRAUTERINE
COMMUNITY
Start: 2021-05-06

## 2021-06-23 ENCOUNTER — TELEPHONE (OUTPATIENT)
Dept: OBSTETRICS AND GYNECOLOGY | Facility: CLINIC | Age: 46
End: 2021-06-23

## 2021-06-23 DIAGNOSIS — Z97.5 IUD (INTRAUTERINE DEVICE) IN PLACE: Primary | ICD-10-CM

## 2021-06-24 ENCOUNTER — PROCEDURE VISIT (OUTPATIENT)
Dept: OBSTETRICS AND GYNECOLOGY | Facility: CLINIC | Age: 46
End: 2021-06-24
Payer: MEDICAID

## 2021-06-24 DIAGNOSIS — Z97.5 IUD (INTRAUTERINE DEVICE) IN PLACE: ICD-10-CM

## 2021-06-24 PROCEDURE — 76830 TRANSVAGINAL US NON-OB: CPT | Mod: S$GLB,,, | Performed by: OBSTETRICS & GYNECOLOGY

## 2021-06-24 PROCEDURE — 76830 PR  ECHOGRAPHY,TRANSVAGINAL: ICD-10-PCS | Mod: S$GLB,,, | Performed by: OBSTETRICS & GYNECOLOGY

## 2023-02-08 NOTE — PROGRESS NOTES
Subjective:       Patient ID: Mary Alice Lanier is a 44 y.o.  at 37w1d     Chief Complaint:  Routine Prenatal Visit      History of Present Illness  No complaints. Reports normal sx. Labs and history reviewed with pt.         Review of Systems  Denies n/v, f/c, dysuria, contractions,   VD, VB, round ligament pain, headaches, preE ROS       Objective:     Vitals:    20 0804   BP: 136/84   Pulse: 83     Wt Readings from Last 3 Encounters:   20 73.9 kg (163 lb)   20 73.5 kg (162 lb)   20 74.4 kg (164 lb)       nad  NCAT  pupils normal size  Skin nml no rashes or lesions  No resp distress, resp even and unlabored  Gravid nt, no rebound no guarding  No cyanosis or clubbing, edema appropriate for pregn    FHT: 140's  CVX: 1-2/50/h    Assessment:        1. Multigravida of advanced maternal age in third trimester    2. History of bowel resection                Plan:        Encouraged PNV  Pain, fever, bleeding precautions   RTC 1 weeks         with patient

## 2023-10-23 ENCOUNTER — OFFICE VISIT (OUTPATIENT)
Dept: OBSTETRICS AND GYNECOLOGY | Facility: CLINIC | Age: 48
End: 2023-10-23
Payer: MEDICAID

## 2023-10-23 VITALS
HEART RATE: 91 BPM | DIASTOLIC BLOOD PRESSURE: 70 MMHG | HEIGHT: 64 IN | BODY MASS INDEX: 23.05 KG/M2 | SYSTOLIC BLOOD PRESSURE: 115 MMHG | WEIGHT: 135 LBS

## 2023-10-23 DIAGNOSIS — N93.9 ABNORMAL UTERINE BLEEDING (AUB): ICD-10-CM

## 2023-10-23 DIAGNOSIS — T83.32XA INTRAUTERINE CONTRACEPTIVE DEVICE THREADS LOST, INITIAL ENCOUNTER: ICD-10-CM

## 2023-10-23 DIAGNOSIS — Z12.31 ENCOUNTER FOR SCREENING MAMMOGRAM FOR BREAST CANCER: Primary | ICD-10-CM

## 2023-10-23 PROCEDURE — 99396 PR PREVENTIVE VISIT,EST,40-64: ICD-10-PCS | Mod: S$GLB,,, | Performed by: OBSTETRICS & GYNECOLOGY

## 2023-10-23 PROCEDURE — 1159F PR MEDICATION LIST DOCUMENTED IN MEDICAL RECORD: ICD-10-PCS | Mod: CPTII,S$GLB,, | Performed by: OBSTETRICS & GYNECOLOGY

## 2023-10-23 PROCEDURE — 3008F PR BODY MASS INDEX (BMI) DOCUMENTED: ICD-10-PCS | Mod: CPTII,S$GLB,, | Performed by: OBSTETRICS & GYNECOLOGY

## 2023-10-23 PROCEDURE — 3008F BODY MASS INDEX DOCD: CPT | Mod: CPTII,S$GLB,, | Performed by: OBSTETRICS & GYNECOLOGY

## 2023-10-23 PROCEDURE — 3074F PR MOST RECENT SYSTOLIC BLOOD PRESSURE < 130 MM HG: ICD-10-PCS | Mod: CPTII,S$GLB,, | Performed by: OBSTETRICS & GYNECOLOGY

## 2023-10-23 PROCEDURE — 1159F MED LIST DOCD IN RCRD: CPT | Mod: CPTII,S$GLB,, | Performed by: OBSTETRICS & GYNECOLOGY

## 2023-10-23 PROCEDURE — 3074F SYST BP LT 130 MM HG: CPT | Mod: CPTII,S$GLB,, | Performed by: OBSTETRICS & GYNECOLOGY

## 2023-10-23 PROCEDURE — 99396 PREV VISIT EST AGE 40-64: CPT | Mod: S$GLB,,, | Performed by: OBSTETRICS & GYNECOLOGY

## 2023-10-23 PROCEDURE — 3078F PR MOST RECENT DIASTOLIC BLOOD PRESSURE < 80 MM HG: ICD-10-PCS | Mod: CPTII,S$GLB,, | Performed by: OBSTETRICS & GYNECOLOGY

## 2023-10-23 PROCEDURE — 3078F DIAST BP <80 MM HG: CPT | Mod: CPTII,S$GLB,, | Performed by: OBSTETRICS & GYNECOLOGY

## 2023-10-23 NOTE — PROGRESS NOTES
"  Subjective:       Patient ID: Mary Alice Lanier is a 47 y.o. female.    Chief Complaint:  Annual Exam and Well Woman      History of Present Illness  Pt here for gyn annual.  History and past labs reviewed with patient.    Complaints heavy cylcle last mo- usually none- has iud      Review of Systems  Review of Systems   Constitutional:  Negative for chills and fever.   Respiratory:  Negative for shortness of breath.    Cardiovascular:  Negative for chest pain.   Gastrointestinal:  Negative for abdominal pain, blood in stool, constipation, diarrhea, nausea, vomiting and reflux.   Genitourinary:  Negative for dysmenorrhea, dyspareunia, dysuria, hematuria, hot flashes, menorrhagia, menstrual problem, pelvic pain, vaginal bleeding, vaginal discharge, postcoital bleeding and vaginal dryness.   Musculoskeletal:  Negative for arthralgias and joint swelling.   Integumentary:  Negative for rash, hair changes, breast mass, nipple discharge and breast skin changes.   Psychiatric/Behavioral:  Negative for depression. The patient is not nervous/anxious.    Breast: Negative for asymmetry, lump, mass, nipple discharge and skin changes          Objective:     Vitals:    10/23/23 1305   BP: 115/70   Pulse: 91   Weight: 61.2 kg (135 lb)   Height: 5' 4" (1.626 m)       Physical Exam:   Constitutional: She appears well-developed and well-nourished. No distress.    HENT:   Head: Normocephalic and atraumatic.    Eyes: Conjunctivae and EOM are normal.    Neck: No tracheal deviation present. No thyromegaly present.    Cardiovascular:       Exam reveals no clubbing, no cyanosis and no edema.        Pulmonary/Chest: Effort normal. No respiratory distress.        Abdominal: Soft. She exhibits no distension and no mass. There is no abdominal tenderness. There is no rebound and no guarding. No hernia.     Genitourinary:    Vagina, uterus and rectum normal.      Pelvic exam was performed with patient supine.   There is no rash, tenderness, lesion or " injury on the right labia. There is no rash, tenderness, lesion or injury on the left labia. Cervix is normal. Right adnexum displays no mass, no tenderness and no fullness. Left adnexum displays no mass, no tenderness and no fullness.                Skin: She is not diaphoretic. No cyanosis. Nails show no clubbing.         breast exam wnl- no nipple dc, skin changes, masses or lymph nodes palpated bilaterally    Assessment:        1. Encounter for screening mammogram for breast cancer    2. Abnormal uterine bleeding (AUB)    3. Intrauterine contraceptive device threads lost, initial encounter                Plan:      Pap utd  Lost iud strings- US    Fsh cbc

## 2023-11-02 LAB
ABS NRBC COUNT: 0 X 10 3/UL (ref 0–0.01)
ABSOLUTE BASOPHIL: 0.05 X 10 3/UL (ref 0–0.22)
ABSOLUTE EOSINOPHIL: 0.21 X 10 3/UL (ref 0.04–0.54)
ABSOLUTE IMMATURE GRAN: 0.04 X 10 3/UL (ref 0–0.04)
ABSOLUTE LYMPHOCYTE: 2.57 X 10 3/UL (ref 0.86–4.75)
ABSOLUTE MONOCYTE: 0.57 X 10 3/UL (ref 0.22–1.08)
BASOPHILS NFR BLD: 0.7 % (ref 0.2–1.2)
EOSINOPHIL NFR BLD: 3.1 % (ref 0.7–7)
FSH: 1.65 MIU/ML
HCT VFR BLD AUTO: 40.3 % (ref 37–47)
HGB BLD-MCNC: 13.5 G/DL (ref 12–16)
IMMATURE GRANULOCYTES: 0.6 % (ref 0–0.5)
LYMPHOCYTES NFR BLD: 38.5 % (ref 19.3–53.1)
MCH RBC QN AUTO: 31 PG (ref 27–32)
MCHC RBC AUTO-ENTMCNC: 33.5 G/DL (ref 32–36)
MCV RBC AUTO: 92.4 FL (ref 82–100)
MONOCYTES NFR BLD: 8.5 % (ref 4.7–12.5)
NEUTROPHILS # BLD AUTO: 3.24 X 10 3/UL (ref 2.15–7.56)
NEUTROPHILS NFR BLD: 48.6 % (ref 34–71.1)
NUCLEATED RED BLOOD CELLS: 0 /100 WBC (ref 0–0.2)
PLATELET # BLD AUTO: 231 X 10 3/UL (ref 135–400)
RBC # BLD AUTO: 4.36 X 10 6/UL (ref 4.2–5.4)
RDW-SD: 41.4 FL (ref 37–54)
WBC # BLD: 6.68 X 10 3/UL (ref 4.3–10.8)

## 2023-11-06 DIAGNOSIS — N93.9 ABNORMAL UTERINE BLEEDING (AUB): Primary | ICD-10-CM

## 2023-11-07 ENCOUNTER — OFFICE VISIT (OUTPATIENT)
Dept: OBSTETRICS AND GYNECOLOGY | Facility: CLINIC | Age: 48
End: 2023-11-07
Payer: MEDICAID

## 2023-11-07 ENCOUNTER — PROCEDURE VISIT (OUTPATIENT)
Dept: OBSTETRICS AND GYNECOLOGY | Facility: CLINIC | Age: 48
End: 2023-11-07
Payer: MEDICAID

## 2023-11-07 VITALS
BODY MASS INDEX: 23.17 KG/M2 | HEART RATE: 91 BPM | DIASTOLIC BLOOD PRESSURE: 70 MMHG | SYSTOLIC BLOOD PRESSURE: 115 MMHG | WEIGHT: 135 LBS

## 2023-11-07 DIAGNOSIS — N93.9 ABNORMAL UTERINE BLEEDING (AUB): ICD-10-CM

## 2023-11-07 DIAGNOSIS — N93.9 ABNORMAL UTERINE BLEEDING (AUB): Primary | ICD-10-CM

## 2023-11-07 PROCEDURE — 3074F SYST BP LT 130 MM HG: CPT | Mod: CPTII,S$GLB,, | Performed by: OBSTETRICS & GYNECOLOGY

## 2023-11-07 PROCEDURE — 3078F PR MOST RECENT DIASTOLIC BLOOD PRESSURE < 80 MM HG: ICD-10-PCS | Mod: CPTII,S$GLB,, | Performed by: OBSTETRICS & GYNECOLOGY

## 2023-11-07 PROCEDURE — 1159F PR MEDICATION LIST DOCUMENTED IN MEDICAL RECORD: ICD-10-PCS | Mod: CPTII,S$GLB,, | Performed by: OBSTETRICS & GYNECOLOGY

## 2023-11-07 PROCEDURE — 3008F PR BODY MASS INDEX (BMI) DOCUMENTED: ICD-10-PCS | Mod: CPTII,S$GLB,, | Performed by: OBSTETRICS & GYNECOLOGY

## 2023-11-07 PROCEDURE — 1159F MED LIST DOCD IN RCRD: CPT | Mod: CPTII,S$GLB,, | Performed by: OBSTETRICS & GYNECOLOGY

## 2023-11-07 PROCEDURE — 99212 PR OFFICE/OUTPT VISIT, EST, LEVL II, 10-19 MIN: ICD-10-PCS | Mod: S$GLB,,, | Performed by: OBSTETRICS & GYNECOLOGY

## 2023-11-07 PROCEDURE — 76830 TRANSVAGINAL US NON-OB: CPT | Mod: S$GLB,,, | Performed by: OBSTETRICS & GYNECOLOGY

## 2023-11-07 PROCEDURE — 76830 US OB/GYN PROCEDURE (VIEWPOINT): ICD-10-PCS | Mod: S$GLB,,, | Performed by: OBSTETRICS & GYNECOLOGY

## 2023-11-07 PROCEDURE — 3008F BODY MASS INDEX DOCD: CPT | Mod: CPTII,S$GLB,, | Performed by: OBSTETRICS & GYNECOLOGY

## 2023-11-07 PROCEDURE — 99212 OFFICE O/P EST SF 10 MIN: CPT | Mod: S$GLB,,, | Performed by: OBSTETRICS & GYNECOLOGY

## 2023-11-07 PROCEDURE — 3078F DIAST BP <80 MM HG: CPT | Mod: CPTII,S$GLB,, | Performed by: OBSTETRICS & GYNECOLOGY

## 2023-11-07 PROCEDURE — 3074F PR MOST RECENT SYSTOLIC BLOOD PRESSURE < 130 MM HG: ICD-10-PCS | Mod: CPTII,S$GLB,, | Performed by: OBSTETRICS & GYNECOLOGY
